# Patient Record
Sex: FEMALE | Race: WHITE | NOT HISPANIC OR LATINO
[De-identification: names, ages, dates, MRNs, and addresses within clinical notes are randomized per-mention and may not be internally consistent; named-entity substitution may affect disease eponyms.]

---

## 2019-05-16 ENCOUNTER — RESULT REVIEW (OUTPATIENT)
Age: 54
End: 2019-05-16

## 2019-05-22 ENCOUNTER — RESULT REVIEW (OUTPATIENT)
Age: 54
End: 2019-05-22

## 2021-12-08 ENCOUNTER — APPOINTMENT (OUTPATIENT)
Dept: OBGYN | Facility: CLINIC | Age: 56
End: 2021-12-08
Payer: COMMERCIAL

## 2021-12-08 ENCOUNTER — NON-APPOINTMENT (OUTPATIENT)
Age: 56
End: 2021-12-08

## 2021-12-08 VITALS
SYSTOLIC BLOOD PRESSURE: 144 MMHG | DIASTOLIC BLOOD PRESSURE: 87 MMHG | HEIGHT: 58 IN | BODY MASS INDEX: 20.36 KG/M2 | WEIGHT: 97 LBS

## 2021-12-08 PROCEDURE — 99386 PREV VISIT NEW AGE 40-64: CPT

## 2021-12-08 RX ORDER — ESTRADIOL 0.1 MG/G
0.1 CREAM VAGINAL
Qty: 1 | Refills: 1 | Status: ACTIVE | COMMUNITY
Start: 2021-12-08 | End: 1900-01-01

## 2021-12-08 NOTE — HISTORY OF PRESENT ILLNESS
[FreeTextEntry1] : pt is a 55 y/o p3 postmenopausal presents for new pt annual gyn visit with complaint of coital bleeding

## 2021-12-09 LAB — HPV HIGH+LOW RISK DNA PNL CVX: NOT DETECTED

## 2021-12-14 LAB — CYTOLOGY CVX/VAG DOC THIN PREP: ABNORMAL

## 2022-07-12 ENCOUNTER — NON-APPOINTMENT (OUTPATIENT)
Age: 57
End: 2022-07-12

## 2022-07-14 ENCOUNTER — APPOINTMENT (OUTPATIENT)
Dept: OBGYN | Facility: CLINIC | Age: 57
End: 2022-07-14

## 2022-07-14 VITALS
WEIGHT: 100 LBS | SYSTOLIC BLOOD PRESSURE: 127 MMHG | HEIGHT: 58 IN | BODY MASS INDEX: 20.99 KG/M2 | DIASTOLIC BLOOD PRESSURE: 76 MMHG

## 2022-07-14 DIAGNOSIS — N93.0 POSTCOITAL AND CONTACT BLEEDING: ICD-10-CM

## 2022-07-14 PROCEDURE — 99212 OFFICE O/P EST SF 10 MIN: CPT

## 2022-07-18 LAB — HPV HIGH+LOW RISK DNA PNL CVX: NOT DETECTED

## 2022-07-28 LAB — CYTOLOGY CVX/VAG DOC THIN PREP: ABNORMAL

## 2022-08-17 ENCOUNTER — APPOINTMENT (OUTPATIENT)
Dept: OBGYN | Facility: CLINIC | Age: 57
End: 2022-08-17

## 2022-08-17 ENCOUNTER — ASOB RESULT (OUTPATIENT)
Age: 57
End: 2022-08-17

## 2022-08-17 PROCEDURE — 76830 TRANSVAGINAL US NON-OB: CPT

## 2022-08-31 ENCOUNTER — NON-APPOINTMENT (OUTPATIENT)
Age: 57
End: 2022-08-31

## 2022-09-19 ENCOUNTER — APPOINTMENT (OUTPATIENT)
Dept: OBGYN | Facility: CLINIC | Age: 57
End: 2022-09-19

## 2022-09-19 DIAGNOSIS — R87.619 UNSPECIFIED ABNORMAL CYTOLOGICAL FINDINGS IN SPECIMENS FROM CERVIX UTERI: ICD-10-CM

## 2022-09-19 PROCEDURE — 58100 BIOPSY OF UTERUS LINING: CPT

## 2022-09-19 NOTE — PROCEDURE
[Endometrial Biopsy] : Endometrial biopsy [Abnormal Pap] : abnormal Pap smear [Risks] : risks [Benefits] : benefits [Patient] : patient [Infection] : infection [Bleeding] : bleeding [Allergic Reaction] : allergic reaction [Tenaculum] : Tenaculum [Easy Passage] : Easy passage [Scant] : scant [Sent to Pathology] : placed in buffered formalin and sent for pathology [Tolerated Well] : Patient tolerated the procedure well [No Complications] : No complications [de-identified] : endometrial cells on pap [de-identified] : pain ease spray

## 2022-09-28 ENCOUNTER — NON-APPOINTMENT (OUTPATIENT)
Age: 57
End: 2022-09-28

## 2022-09-28 LAB — CORE LAB BIOPSY: NORMAL

## 2024-01-03 DIAGNOSIS — R92.30 DENSE BREASTS, UNSPECIFIED: ICD-10-CM

## 2024-02-05 ENCOUNTER — APPOINTMENT (OUTPATIENT)
Dept: OBGYN | Facility: CLINIC | Age: 59
End: 2024-02-05
Payer: COMMERCIAL

## 2024-02-05 VITALS
WEIGHT: 102 LBS | BODY MASS INDEX: 21.41 KG/M2 | DIASTOLIC BLOOD PRESSURE: 89 MMHG | SYSTOLIC BLOOD PRESSURE: 128 MMHG | HEIGHT: 58 IN

## 2024-02-05 DIAGNOSIS — Z01.419 ENCOUNTER FOR GYNECOLOGICAL EXAMINATION (GENERAL) (ROUTINE) W/OUT ABNORMAL FINDINGS: ICD-10-CM

## 2024-02-05 PROCEDURE — 99396 PREV VISIT EST AGE 40-64: CPT

## 2024-02-05 NOTE — PHYSICAL EXAM
[Appropriately responsive] : appropriately responsive [Alert] : alert [No Acute Distress] : no acute distress [No Murmurs] : no murmurs [Soft] : soft [Non-tender] : non-tender [Non-distended] : non-distended [No HSM] : No HSM [No Lesions] : no lesions [No Mass] : no mass [Oriented x3] : oriented x3 [Examination Of The Breasts] : a normal appearance [] : implants [No Masses] : no breast masses were palpable [Labia Majora] : normal [Labia Minora] : normal [Normal] : normal [Uterine Adnexae] : normal

## 2024-02-06 LAB — HPV HIGH+LOW RISK DNA PNL CVX: NOT DETECTED

## 2024-02-09 LAB — CYTOLOGY CVX/VAG DOC THIN PREP: ABNORMAL

## 2025-05-08 ENCOUNTER — APPOINTMENT (OUTPATIENT)
Dept: OBGYN | Facility: CLINIC | Age: 60
End: 2025-05-08